# Patient Record
(demographics unavailable — no encounter records)

---

## 2024-12-13 NOTE — REVIEW OF SYSTEMS
[Dyspnea on exertion] : dyspnea during exertion [Negative] : Heme/Lymph [Palpitations] : palpitations [SOB] : no shortness of breath [Chest Discomfort] : no chest discomfort [Leg Claudication] : no intermittent leg claudication [Orthopnea] : no orthopnea [Syncope] : no syncope

## 2024-12-13 NOTE — HISTORY OF PRESENT ILLNESS
[FreeTextEntry1] : Pt is an 88 y/o F PMH PVCs on metoprolol, HTN, HLD, hypothyroidism, preDM.    She has been feeling CARTAGENA and palpitations when she climbs stairs, resolves with rest. She denies CP< diaphoresis, palpitations, dizziness, syncope, LE edema, PND, orthopnea.   Home 's Pt is accompanied by her daughter Family hx: mother "skipping heart", father CVA 71  TTE 10/2020 EF 56%, mild MR TTE 03/2022 EF 55-60%, mild TR, min MR TTE 10/2023 EF 60%, mild TR naik 10/2020 NSR PVCs 15.5% naik 03/2022 NSR PVCs 11.8%  losartan caused throat irritation and cough  PMH: HTN, cervical cancer s/p hysterectomy, breast cancer s/p lumpectomy/RXT 2012, hypothyroidism Smoking status: never no ETOH no drug use Current exercise: very active - does yard work Daily water intake: "a lot" Daily caffeine intake: 2 cups coffee OTC medications: advil PRN ASA GI upset Previous hospitalizations: hysterectomy

## 2024-12-13 NOTE — DISCUSSION/SUMMARY
[EKG obtained to assist in diagnosis and management of assessed problem(s)] : EKG obtained to assist in diagnosis and management of assessed problem(s) [FreeTextEntry1] : Pt is an 90 y/o F with PVCs, HTN, preDM She has been getting palpitations and SOB with exertion.   Given pt's symptoms will check naik monitor to assess for ectopy.  Advised adequate hydration.  Drug use, OTC medication use, caffeine consumption reviewed  Naik placed same day as office visit.  repeat TTE check nuc stress test to eval for ischemia (of note she did not have stress test done after last OV) Advised pt to go to the nearest ED if symptoms persist or worsen   PVCs:  normal LV function c/w metoprolol repeat naik and TTE  HTN:  well controlled at home  c/w metoprolol succ 100mg qd Discussed the long-term health risks of uncontrolled BP.  Advised low salt diet, regular exercise, maintaining ideal weight. Encouraged pt to check BP at home and keep journal   The described plan was discussed with the pt and daughter.  All questions and concerns were addressed to the best of my knowledge.